# Patient Record
Sex: FEMALE | Race: WHITE | URBAN - METROPOLITAN AREA
[De-identification: names, ages, dates, MRNs, and addresses within clinical notes are randomized per-mention and may not be internally consistent; named-entity substitution may affect disease eponyms.]

---

## 2024-05-15 ENCOUNTER — TELEPHONE (OUTPATIENT)
Dept: OTHER | Facility: OTHER | Age: 34
End: 2024-05-15

## 2024-05-16 NOTE — TELEPHONE ENCOUNTER
Patient is calling regarding cancelling an appointment.    Date/Time: 05/16 @8am     Patient was rescheduled: YES [] NO [x]    Patient requesting call back to reschedule: YES [x] NO []

## 2024-06-14 ENCOUNTER — OFFICE VISIT (OUTPATIENT)
Dept: OBGYN CLINIC | Facility: CLINIC | Age: 34
End: 2024-06-14

## 2024-06-14 VITALS
DIASTOLIC BLOOD PRESSURE: 82 MMHG | BODY MASS INDEX: 28.95 KG/M2 | WEIGHT: 169.6 LBS | HEART RATE: 71 BPM | SYSTOLIC BLOOD PRESSURE: 120 MMHG | HEIGHT: 64 IN

## 2024-06-14 DIAGNOSIS — B37.9 YEAST INFECTION: ICD-10-CM

## 2024-06-14 DIAGNOSIS — Z01.419 ENCOUNTER FOR ANNUAL ROUTINE GYNECOLOGICAL EXAMINATION: Primary | ICD-10-CM

## 2024-06-14 LAB
BV WHIFF TEST VAG QL: NEGATIVE
CLUE CELLS SPEC QL WET PREP: NEGATIVE
PH SMN: 4.5 [PH]
T VAGINALIS VAG QL WET PREP: NEGATIVE
YEAST VAG QL WET PREP: POSITIVE

## 2024-06-14 PROCEDURE — G0476 HPV COMBO ASSAY CA SCREEN: HCPCS | Performed by: NURSE PRACTITIONER

## 2024-06-14 PROCEDURE — G0145 SCR C/V CYTO,THINLAYER,RESCR: HCPCS | Performed by: NURSE PRACTITIONER

## 2024-06-14 PROCEDURE — 99385 PREV VISIT NEW AGE 18-39: CPT | Performed by: NURSE PRACTITIONER

## 2024-06-14 PROCEDURE — 87210 SMEAR WET MOUNT SALINE/INK: CPT | Performed by: NURSE PRACTITIONER

## 2024-06-14 RX ORDER — FLUCONAZOLE 150 MG/1
150 TABLET ORAL ONCE
Qty: 1 TABLET | Refills: 1 | Status: SHIPPED | OUTPATIENT
Start: 2024-06-14 | End: 2024-06-14

## 2024-06-14 NOTE — PATIENT INSTRUCTIONS
Take Fluconazole as directed  Wear loose clothes and cotton underwear  PAP results can take up to 2 weeks  Call with needs or concerns  Return in 1 year

## 2024-06-14 NOTE — PROGRESS NOTES
Annual Exam    Assessment   1. Encounter for annual routine gynecological examination  Liquid-based pap, screening      2. Yeast infection  POCT wet mount    fluconazole (DIFLUCAN) 150 mg tablet        well woman       Plan       All questions answered.  Breast self exam technique reviewed and patient encouraged to perform self-exam monthly.  Contraception: tubal ligation.  Discussed healthy lifestyle modifications.  Follow up in 1 year.  Thin prep Pap smear.     Patient Instructions   Take Fluconazole as directed  Wear loose clothes and cotton underwear  PAP results can take up to 2 weeks  Call with needs or concerns  Return in 1 year  Pt verbalized understanding of all discussed.      Subjective      Kaila Purdy is a 33 y.o.  female who presents for annual well woman exam. Periods are regular every 28-30 days, lasting 4 days. No intermenstrual bleeding, spotting, or discharge.  Last GYN care was > 12 year ago in [place]  1 partner x 2 months, denies domestic violence, reinforce safe sex and condom use, provided [facility] information for STD esting    Explained thick white discharge was noted on exam, pt states she has noted the discharge and has had itching and burning.    Explained wet mount was positive for yeast, safe and effective use of Fluconazole was provided, discussed comfort measures      Depression Screening Follow-up Plan: Patient's depression screening was negative with a PHQ-2 score of 1. Their PHQ-9 score was 5. Clinically patient does not have depression. No treatment is required.      Current contraception: tubal ligation  History of abnormal Pap smear: no  Family history of uterine or ovarian cancer: no  Regular self breast exam: yes  History of abnormal mammogram: no  Family history of breast cancer: no  History of abnormal lipids: unknown  Menstrual History:  OB History          3    Para   3    Term   3            AB        Living   3          "SAB        IAB        Ectopic        Multiple        Live Births   3                Menarche age: 12  Patient's last menstrual period was 05/28/2024 (exact date).5/28/2024       The following portions of the patient's history were reviewed and updated as appropriate: allergies, current medications, past family history, past medical history, past social history, past surgical history and problem list.    Review of Systems  Pertinent items are noted in HPI.      Objective      /82 (BP Location: Right arm, Patient Position: Sitting, Cuff Size: Standard)   Pulse 71   Ht 5' 4\" (1.626 m)   Wt 76.9 kg (169 lb 9.6 oz)   LMP 05/28/2024 (Exact Date)   BMI 29.11 kg/m²     General: alert and oriented, in no acute distress, alert, appears stated age, and cooperative   Heart: NSR   Lungs: clear to auscultation bilaterally, WNL respiratory effort, negative cough or SOB   Thyroid: Negative masses palpable   Abdomen: soft, non-tender, without masses or organomegaly palpable   Vulva: normal   Vagina: Positive erythema, thick white discharge, ph 4.5, wet mount positive for yeast   Cervix: no bleeding following Pap, no cervical motion tenderness, no lesions, and Thick white discharge   Uterus: normal size, non-tender, normal shape and consistency   Adnexa: normal adnexa   Urethra: normal   Breasts: NT,negative masses palpable, negative discharge, or dimpling             "

## 2024-06-14 NOTE — LETTER
2024    To Kaila Jie Purdy  : 1990      Esta carta es para informarle que anyi resultados recientes de la prueba de Papanicolaou fueron revisados por mí y son NORMALES.  Nos vemos en 1 año para barros examen anual.    MICHELLE Nix

## 2024-06-17 LAB
HPV HR 12 DNA CVX QL NAA+PROBE: NEGATIVE
HPV16 DNA CVX QL NAA+PROBE: NEGATIVE
HPV18 DNA CVX QL NAA+PROBE: NEGATIVE

## 2024-06-19 LAB
LAB AP GYN PRIMARY INTERPRETATION: NORMAL
Lab: NORMAL
PATH INTERP SPEC-IMP: NORMAL

## 2024-12-05 ENCOUNTER — OFFICE VISIT (OUTPATIENT)
Dept: OBGYN CLINIC | Facility: CLINIC | Age: 34
End: 2024-12-05

## 2024-12-05 VITALS
WEIGHT: 175 LBS | DIASTOLIC BLOOD PRESSURE: 81 MMHG | SYSTOLIC BLOOD PRESSURE: 117 MMHG | HEART RATE: 73 BPM | BODY MASS INDEX: 30.04 KG/M2

## 2024-12-05 DIAGNOSIS — B37.31 VAGINAL CANDIDIASIS: ICD-10-CM

## 2024-12-05 DIAGNOSIS — N89.8 VAGINAL DISCHARGE: Primary | ICD-10-CM

## 2024-12-05 DIAGNOSIS — N89.8 VAGINA ITCHING: ICD-10-CM

## 2024-12-05 LAB
BV WHIFF TEST VAG QL: ABNORMAL
CLUE CELLS SPEC QL WET PREP: ABNORMAL
PH SMN: 4.5 [PH]
SL AMB POCT WET MOUNT: ABNORMAL
T VAGINALIS VAG QL WET PREP: ABNORMAL
YEAST VAG QL WET PREP: ABNORMAL

## 2024-12-05 PROCEDURE — 99213 OFFICE O/P EST LOW 20 MIN: CPT | Performed by: NURSE PRACTITIONER

## 2024-12-05 PROCEDURE — 87491 CHLMYD TRACH DNA AMP PROBE: CPT | Performed by: NURSE PRACTITIONER

## 2024-12-05 PROCEDURE — 87591 N.GONORRHOEAE DNA AMP PROB: CPT | Performed by: NURSE PRACTITIONER

## 2024-12-05 PROCEDURE — 87210 SMEAR WET MOUNT SALINE/INK: CPT | Performed by: NURSE PRACTITIONER

## 2024-12-05 RX ORDER — FLUCONAZOLE 150 MG/1
150 TABLET ORAL ONCE
Qty: 1 TABLET | Refills: 0 | Status: SHIPPED | OUTPATIENT
Start: 2024-12-05 | End: 2024-12-05

## 2024-12-05 NOTE — LETTER
2024    To Kaila Ceron Sandioh  : 1990      Esta carta es para informarle que anyi CULTURAS recientes para la gonorrea y la clamidia fueron revisadas por mí y son NORMALES.  Comuníquese con la oficina para amie federico si tiene alguna inquietud adicional.    MICHELLE Nix

## 2024-12-05 NOTE — PATIENT INSTRUCTIONS
Jorge Luis por barros confianza en nuestro equipo.   Le agradecemos y agradecemos anyi comentarios.   Si recibe amie encuesta nuestra, tómese unos momentos para informarnos cómo estamos.   Sinceramente,  MICHELLE Nix

## 2024-12-05 NOTE — PROGRESS NOTES
PROBLEM GYNECOLOGICAL VISIT    Kaila Purdy is a 34 y.o. female who presents today with complaint of vaginal itching.  Her general medical history has been reviewed and she reports it as follows:    Past Medical History:   Diagnosis Date    No known health problems      Past Surgical History:   Procedure Laterality Date     SECTION      , , 2018    OVARIAN CYST REMOVAL Bilateral     w/BTL    TUBAL LIGATION       OB History          3    Para   3    Term   3       0    AB   0    Living   3         SAB   0    IAB   0    Ectopic   0    Multiple   0    Live Births   3               Social History     Tobacco Use    Smoking status: Never    Smokeless tobacco: Never   Vaping Use    Vaping status: Never Used   Substance Use Topics    Alcohol use: Yes     Comment: couple times/month    Drug use: Never     Social History     Substance and Sexual Activity   Sexual Activity Yes    Partners: Male    Birth control/protection: Female Sterilization       No current outpatient medications    History of Present Illness:   Reports vaginal itching and discharge for the past month off/on.  Denies vaginal odor.  Denies pelvic pain.  Reports menses regular.    Review of Systems:  Review of Systems   Constitutional: Negative.    Gastrointestinal: Negative.    Genitourinary:  Positive for vaginal discharge and vaginal pain (itching). Negative for pelvic pain.       Physical Exam:  /81 (BP Location: Left arm, Patient Position: Sitting, Cuff Size: Standard)   Pulse 73   Wt 79.4 kg (175 lb)   LMP 2024 (Exact Date)   BMI 30.04 kg/m²   Physical Exam  Constitutional:       General: She is not in acute distress.  Genitourinary:      Vulva exam comments: Normal.      Vaginal discharge and erythema present.   Neurological:      Mental Status: She is alert.   Skin:     General: Skin is warm and dry.   Vitals reviewed.       Point of Care Testing:   -Wet mount: no clue cells, no  trichomonads, few WBC's, pH=4.5   -KOH mount: + hyphae   -Whiff: negative    Assessment:   1. Vaginal Candidiasis.    Plan:   1. Cultures ordered: GC/CT.   2. Given Rx Diflucan.   3. Return to office in 7 months for annual GYN exam.

## 2024-12-06 ENCOUNTER — TELEPHONE (OUTPATIENT)
Dept: OBGYN CLINIC | Facility: CLINIC | Age: 34
End: 2024-12-06

## 2024-12-06 LAB
C TRACH DNA SPEC QL NAA+PROBE: NEGATIVE
N GONORRHOEA DNA SPEC QL NAA+PROBE: NEGATIVE

## 2024-12-06 NOTE — TELEPHONE ENCOUNTER
Patient was at appointment yesterday for yeast infection and was instructed to come back next Thursday (12/12/24) to see Cherie Jordan. Provider schedule is open for 1:00pm and until 6:45pm. Patient cannot come in this day in the PM due to patient having to  family member at the airport in [place]. Please advise if patient can come in on another date or can be seen by another provider.

## 2024-12-12 ENCOUNTER — OFFICE VISIT (OUTPATIENT)
Dept: OBGYN CLINIC | Facility: CLINIC | Age: 34
End: 2024-12-12

## 2024-12-12 VITALS
DIASTOLIC BLOOD PRESSURE: 80 MMHG | WEIGHT: 175 LBS | BODY MASS INDEX: 30.04 KG/M2 | SYSTOLIC BLOOD PRESSURE: 121 MMHG | HEART RATE: 72 BPM

## 2024-12-12 DIAGNOSIS — N90.89 LABIAL SKIN TAG: Primary | ICD-10-CM

## 2024-12-12 PROCEDURE — 56605 BIOPSY OF VULVA/PERINEUM: CPT | Performed by: OBSTETRICS & GYNECOLOGY

## 2024-12-12 PROCEDURE — 88305 TISSUE EXAM BY PATHOLOGIST: CPT | Performed by: PATHOLOGY

## 2024-12-12 RX ORDER — LIDOCAINE HYDROCHLORIDE 20 MG/ML
2 INJECTION, SOLUTION EPIDURAL; INFILTRATION; INTRACAUDAL; PERINEURAL ONCE
Status: COMPLETED | OUTPATIENT
Start: 2024-12-12 | End: 2024-12-12

## 2024-12-12 RX ADMIN — LIDOCAINE HYDROCHLORIDE 2 ML: 20 INJECTION, SOLUTION EPIDURAL; INFILTRATION; INTRACAUDAL; PERINEURAL at 14:36

## 2024-12-12 NOTE — PROGRESS NOTES
Biopsy    Date/Time: 12/12/2024 1:15 PM    Performed by: Lashonda Sams DO  Authorized by: Lashonda Sams DO  Universal Protocol:  Procedure performed by: (Dr davis)  Risks and benefits: risks, benefits and alternatives were discussed  Consent given by: patient  Patient understanding: patient states understanding of the procedure being performed  Patient consent: the patient's understanding of the procedure matches consent given  Procedure consent: procedure consent matches procedure scheduled  Required items: required blood products, implants, devices, and special equipment available  Patient identity confirmed: verbally with patient    Procedure Details - Lesion Biopsy:     Body area:  Anogenital    Anogenital location:  Vulva    Biopsy method: shave biopsy      Biopsy tissue type: skin    Initial size (mm):  2     Patient with 2 week of skin tag, hurts with touch. No bleeding. Appears as condyloma      Lashonda Sams DO   Obstetrics & Gynecology PGY-II  12/12/24  2:34 PM

## 2024-12-16 PROCEDURE — 88305 TISSUE EXAM BY PATHOLOGIST: CPT | Performed by: PATHOLOGY

## 2024-12-20 ENCOUNTER — TELEPHONE (OUTPATIENT)
Dept: LABOR AND DELIVERY | Facility: HOSPITAL | Age: 34
End: 2024-12-20

## 2024-12-20 ENCOUNTER — TELEPHONE (OUTPATIENT)
Age: 34
End: 2024-12-20

## 2024-12-20 DIAGNOSIS — L98.0 PYOGENIC GRANULOMA: Primary | ICD-10-CM

## 2024-12-20 NOTE — TELEPHONE ENCOUNTER
Called patient to discuss results of biopsy let her know that the biopsy was consistent with pyogenic granuloma.  Placed referral to dermatology.  All patient questions answered    Lashonda Sams, DO   Obstetrics & Gynecology PGY-II  12/20/24  2:18 PM

## 2024-12-24 ENCOUNTER — TELEPHONE (OUTPATIENT)
Age: 34
End: 2024-12-24

## 2024-12-24 NOTE — TELEPHONE ENCOUNTER
LVM for pt regarding wanting estimate for appt in September. Informed pt that for self pay there will be a $30 copay but the other estimates will go through billing, provided phone number [phone]. Advised pt to callback if have any other questions we can help with.

## 2025-01-11 NOTE — PROGRESS NOTES
"OB/GYN VISIT  Kaila Purdy  2025  9:51 AM    Subjective:     Kaila Purdy is a 34 y.o.  female who presents for follow up of vulvar skin lesion. Patient underwent biopsy on 24 which came back as pyogenic granuloma. Patient notes that the biopsy healed well but the lesion itself is still bothering her as it is rubbing against her clothes.      Past Medical History:   Diagnosis Date    No known health problems      Past Surgical History:   Procedure Laterality Date     SECTION      , , 2018    OVARIAN CYST REMOVAL Bilateral     w/BTL    TUBAL LIGATION         Objective:    Vitals: Blood pressure 120/81, pulse 60, height 5' 4\" (1.626 m), weight 79.8 kg (176 lb), last menstrual period 2024.Body mass index is 30.21 kg/m².    Physical Exam  Constitutional:       General: She is not in acute distress.  HENT:      Head: Normocephalic and atraumatic.   Eyes:      Extraocular Movements: Extraocular movements intact.   Cardiovascular:      Rate and Rhythm: Normal rate.   Pulmonary:      Effort: Pulmonary effort is normal. No respiratory distress.   Abdominal:      General: Abdomen is flat.   Genitourinary:     Comments: Approximately 5mm lobular lesion noted at superior aspect of midline labia majora  Musculoskeletal:         General: No swelling.   Skin:     General: Skin is warm and dry.   Neurological:      General: No focal deficit present.      Mental Status: She is alert.   Psychiatric:         Mood and Affect: Mood normal.       Biopsy    Date/Time: 2025 9:00 AM    Performed by: Laverne Padilla MD  Authorized by: Laverne Padilla MD  Universal Protocol:  Procedure performed by: (Ismael)  Consent: Verbal consent obtained.  Consent given by: patient  Patient understanding: patient states understanding of the procedure being performed    Procedure Details - Lesion Biopsy:     Body area:  Anogenital    Anogenital location:  Vulva    Biopsy " tissue type: skin    Malignancy: benign lesion       3mL of lidocaine with epinephrine injected at base of lesion. Surgical scissors used to remove pyogenic granuloma. Base then cauterized with silver nitrate. Adequate hemostasis obtained.      Assessment/Plan:  1. Pyogenic granuloma  -     lidocaine-epinephrine (XYLOCAINE/EPINEPHRINE) 2 %-1:100,000 injection 1 mL  -     Tissue Exam  - Counseled patient on biopsy results   - Lesion removed in office today  - Patient tolerated procedure well  - Counseled patient to avoid shaving until lesion has healed      Laverne Padilla MD  1/14/2025  9:51 AM

## 2025-01-14 ENCOUNTER — OFFICE VISIT (OUTPATIENT)
Dept: OBGYN CLINIC | Facility: CLINIC | Age: 35
End: 2025-01-14

## 2025-01-14 VITALS
DIASTOLIC BLOOD PRESSURE: 81 MMHG | SYSTOLIC BLOOD PRESSURE: 120 MMHG | WEIGHT: 176 LBS | HEIGHT: 64 IN | BODY MASS INDEX: 30.05 KG/M2 | HEART RATE: 60 BPM

## 2025-01-14 DIAGNOSIS — L98.0 PYOGENIC GRANULOMA: Primary | ICD-10-CM

## 2025-01-14 PROCEDURE — 88305 TISSUE EXAM BY PATHOLOGIST: CPT | Performed by: PATHOLOGY

## 2025-01-14 PROCEDURE — 56605 BIOPSY OF VULVA/PERINEUM: CPT | Performed by: OBSTETRICS & GYNECOLOGY

## 2025-01-14 RX ORDER — LIDOCAINE HYDROCHLORIDE AND EPINEPHRINE BITARTRATE 20; .01 MG/ML; MG/ML
1 INJECTION, SOLUTION SUBCUTANEOUS ONCE
Status: COMPLETED | OUTPATIENT
Start: 2025-01-14 | End: 2025-01-14

## 2025-01-14 RX ADMIN — LIDOCAINE HYDROCHLORIDE AND EPINEPHRINE BITARTRATE 1 ML: 20; .01 INJECTION, SOLUTION SUBCUTANEOUS at 09:46

## 2025-01-16 PROCEDURE — 88305 TISSUE EXAM BY PATHOLOGIST: CPT | Performed by: PATHOLOGY

## 2025-01-28 ENCOUNTER — TELEPHONE (OUTPATIENT)
Age: 35
End: 2025-01-28

## 2025-01-28 NOTE — TELEPHONE ENCOUNTER
LVM for pt regarding appt on 09/12 with Dr Delatorre in [place]. Informed pt that Dr BARFIELD is going through a schedule change for his Friday afternoon appointment slots and that his Tuesday afternoon appointment slots are opening for patients. Due to this schedule change, I informed pt that their new appt will be 07/29 at 2 pm with Dr BARFIELD in [place]. Advised pt to callback to r/s or cancel this appt if needed.

## 2025-04-16 PROBLEM — N76.0 VAGINITIS: Status: ACTIVE | Noted: 2025-04-16

## 2025-04-16 NOTE — PROGRESS NOTES
"Name: Kaila Purdy      : 1990      MRN: [id number]  Encounter Provider: Jennie Heaton MD  Encounter Date: 2025   Encounter department: [facility]    Visit conducted with [facility] [place] [employer]  [id number]  Assessment & Plan  Acute vaginitis  Exam and POCT wet mount notable for bacterial vaginosis  Prescription for metronidazole sent    BV (bacterial vaginosis)    Orders:    metroNIDAZOLE (FLAGYL) 500 mg tablet; Take 1 tablet (500 mg total) by mouth every 12 (twelve) hours for 7 days    Vaginal lesion  HSV swab collected in clinic for vulvar lesion  Orders:    HSV TYPE 1,2 DNA PCR [facility] SWAB ONLY    High risk heterosexual behavior  STI testing ordered   Orders:    HIV 1/2 AG/AB w Reflex [facility] for 2 yr old and above; Future    Hepatitis C antibody; Future    Hepatitis B surface antigen; Future    RPR-Syphilis Screening (Total Syphilis IGG/IGM); Future    Chlamydia/GC amplified DNA by PCR; Future        History of Present Illness   HPI  Kaila Purdy is a 34 y.o. female who presents for five days of vaginal itching and foul smelling thin white discharge. She additionally reports bumps that appear on her vulva that are like \"small bumps filled with water\" and appear similar to oral lesions she has had on her lips. She is concerned this is herpes but has never been diagnosed/treated for HSV before. She reports they are moderately painful. She denies other symptoms and requests STI testing today.    ROS negative except per HPI    Medical History Reviewed by provider this encounter:     .     Objective   /74 (BP Location: Left arm, Patient Position: Sitting, Cuff Size: Standard)   Wt 78.3 kg (172 lb 9.6 oz)   LMP 2025 (Approximate)   BMI 29.63 kg/m²      Physical Exam  Vitals and nursing note reviewed.   Constitutional:       General: She is not in acute distress.     Appearance: She is well-developed.   Eyes:      " Conjunctiva/sclera: Conjunctivae normal.   Cardiovascular:      Rate and Rhythm: Normal rate.   Pulmonary:      Effort: Pulmonary effort is normal. No respiratory distress.   Abdominal:      Palpations: Abdomen is soft.      Tenderness: There is no abdominal tenderness.   Genitourinary:     Comments: Vulva with small fleshy papule on left labia majora, does not appear clear or fluid filled, no erythema, moderate tenderness  On speculum exam thin white discharge present with fishy odor. No vaginal or cervical lesions  Musculoskeletal:         General: No swelling.   Skin:     General: Skin is warm and dry.      Capillary Refill: Capillary refill takes less than 2 seconds.   Neurological:      Mental Status: She is alert and oriented to person, place, and time. Mental status is at baseline.   Psychiatric:         Mood and Affect: Mood normal.         Jennie Heaton MD  PGY 2, Obstetrics and Gynecology  4/17/2025  2:09 PM

## 2025-04-17 ENCOUNTER — OFFICE VISIT (OUTPATIENT)
Dept: OBGYN CLINIC | Facility: CLINIC | Age: 35
End: 2025-04-17

## 2025-04-17 VITALS — WEIGHT: 172.6 LBS | DIASTOLIC BLOOD PRESSURE: 74 MMHG | SYSTOLIC BLOOD PRESSURE: 102 MMHG | BODY MASS INDEX: 29.63 KG/M2

## 2025-04-17 DIAGNOSIS — N76.0 ACUTE VAGINITIS: ICD-10-CM

## 2025-04-17 DIAGNOSIS — N76.0 BV (BACTERIAL VAGINOSIS): Primary | ICD-10-CM

## 2025-04-17 DIAGNOSIS — Z72.51 HIGH RISK HETEROSEXUAL BEHAVIOR: ICD-10-CM

## 2025-04-17 DIAGNOSIS — N89.8 VAGINAL LESION: ICD-10-CM

## 2025-04-17 DIAGNOSIS — B96.89 BV (BACTERIAL VAGINOSIS): Primary | ICD-10-CM

## 2025-04-17 LAB
BV WHIFF TEST VAG QL: POSITIVE
CLUE CELLS SPEC QL WET PREP: PRESENT
PH SMN: 5 [PH]
SL AMB POCT WET MOUNT: ABNORMAL
T VAGINALIS VAG QL WET PREP: ABNORMAL
YEAST VAG QL WET PREP: ABNORMAL

## 2025-04-17 PROCEDURE — 87529 HSV DNA AMP PROBE: CPT

## 2025-04-17 PROCEDURE — 99213 OFFICE O/P EST LOW 20 MIN: CPT | Performed by: OBSTETRICS & GYNECOLOGY

## 2025-04-17 PROCEDURE — 87210 SMEAR WET MOUNT SALINE/INK: CPT | Performed by: OBSTETRICS & GYNECOLOGY

## 2025-04-17 RX ORDER — METRONIDAZOLE 500 MG/1
500 TABLET ORAL EVERY 12 HOURS SCHEDULED
Qty: 14 TABLET | Refills: 0 | Status: SHIPPED | OUTPATIENT
Start: 2025-04-17 | End: 2025-04-24

## 2025-04-18 ENCOUNTER — RESULTS FOLLOW-UP (OUTPATIENT)
Dept: OBGYN CLINIC | Facility: CLINIC | Age: 35
End: 2025-04-18

## 2025-04-18 DIAGNOSIS — B00.9 HERPES: Primary | ICD-10-CM

## 2025-04-18 LAB
HSV1 DNA SPEC QL NAA+PROBE: DETECTED
HSV1 DNA SPEC QL NAA+PROBE: NOT DETECTED

## 2025-04-18 RX ORDER — VALACYCLOVIR HYDROCHLORIDE 1 G/1
1000 TABLET, FILM COATED ORAL 2 TIMES DAILY
Qty: 20 TABLET | Refills: 0 | Status: SHIPPED | OUTPATIENT
Start: 2025-04-18 | End: 2025-04-28

## 2025-04-18 NOTE — TELEPHONE ENCOUNTER
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 We discussed that this is consistent with herpes as she expected. Kaila has not been treated for herpes before so will start treatment with Valacyclovir 1000 mg BID for 10 days. She verbalized understanding and will pick the medication up from her pharmacy.    Jennie Heaton MD  PGY 2, Obstetrics and Gynecology  4/18/2025  3:51 PM

## 2025-05-22 ENCOUNTER — OFFICE VISIT (OUTPATIENT)
Dept: FAMILY MEDICINE CLINIC | Facility: CLINIC | Age: 35
End: 2025-05-22

## 2025-05-22 VITALS
DIASTOLIC BLOOD PRESSURE: 80 MMHG | WEIGHT: 172 LBS | RESPIRATION RATE: 18 BRPM | TEMPERATURE: 98 F | OXYGEN SATURATION: 98 % | HEART RATE: 70 BPM | SYSTOLIC BLOOD PRESSURE: 116 MMHG | HEIGHT: 64 IN | BODY MASS INDEX: 29.37 KG/M2

## 2025-05-22 DIAGNOSIS — R35.0 URINARY FREQUENCY: ICD-10-CM

## 2025-05-22 DIAGNOSIS — R42 POSITIONAL LIGHTHEADEDNESS: ICD-10-CM

## 2025-05-22 DIAGNOSIS — R63.5 UNEXPLAINED WEIGHT GAIN: ICD-10-CM

## 2025-05-22 DIAGNOSIS — Z00.00 ANNUAL PHYSICAL EXAM: Primary | ICD-10-CM

## 2025-05-22 LAB
SL AMB  POCT GLUCOSE, UA: ABNORMAL
SL AMB LEUKOCYTE ESTERASE,UA: ABNORMAL
SL AMB POCT BILIRUBIN,UA: ABNORMAL
SL AMB POCT BLOOD,UA: ABNORMAL
SL AMB POCT CLARITY,UA: CLEAR
SL AMB POCT COLOR,UA: YELLOW
SL AMB POCT HEMOGLOBIN AIC: 5.2 (ref ?–6.5)
SL AMB POCT KETONES,UA: ABNORMAL
SL AMB POCT NITRITE,UA: ABNORMAL
SL AMB POCT PH,UA: 6
SL AMB POCT SPECIFIC GRAVITY,UA: 1.02
SL AMB POCT URINE PROTEIN: ABNORMAL
SL AMB POCT UROBILINOGEN: ABNORMAL

## 2025-05-22 NOTE — PROGRESS NOTES
Adult Annual Physical  Name: Kaila Purdy      : 1990      MRN: [id number]  Encounter Provider: MICHELLE Bills  Encounter Date: 2025   Encounter department: [facility]    :  Assessment & Plan  Annual physical exam  Immunizations and preventive care screenings were discussed with patient today. Appropriate education was printed on patient's after visit summary.        Urinary frequency  UA bland and A1C within normal range.   Encouraged patient to limit beverages before bed.     Orders:    POCT hemoglobin A1c    POCT urine dip auto non-scope    Unexplained weight gain  Denies changes to diet or activity. Will order thyroid panel to rule out hypothyroidism.   Encouraged healthy diet choices and increasing exercise.     Orders:    TSH, 3rd generation with Free T4 reflex; Future    Positional lightheadedness  Exam benign.   Patient endorses heavy menses. Will rule out LAILA due to chronic blood loss as well as metabolic derangements.   Counseled on adequate fluid intake and nutrition, change positions slowly.     Orders:    CBC and differential; Future    Comprehensive metabolic panel; Future    Iron Panel (Includes Ferritin, Iron Sat%, Iron, and TIBC); Future        Preventive Screenings:  - Diabetes Screening: orders placed  - Cholesterol Screening: patient declines   - Hepatitis C screening: patient declines   - HIV screening: patient declines   - Cervical cancer screening: screening up-to-date   - Colon cancer screening: screening not indicated   - Lung cancer screening: screening not indicated     Immunizations:  - Immunizations due: Tdap  - Risks/benefits immunizations discussed    - The patient declines recommended vaccines currently despite my recommendations      Counseling/Anticipatory Guidance:    - Dental health: discussed importance of regular tooth brushing, flossing, and dental visits.   - Sexual health: discussed sexually transmitted  "diseases, partner selection, use of condoms, avoidance of unintended pregnancy, and contraceptive alternatives.   - Diet: discussed recommendations for a healthy/well-balanced diet.   - Exercise: the importance of regular exercise/physical activity was discussed. Recommend exercise 3-5 times per week for at least 30 minutes.   - Injury prevention: discussed safety/seat belts, safety helmets, smoke detectors, carbon monoxide detectors, and smoking near bedding or upholstery.     BMI Counseling: Body mass index is 29.52 kg/m². The BMI is above normal. Nutrition recommendations include decreasing portion sizes, encouraging healthy choices of fruits and vegetables, decreasing fast food intake, consuming healthier snacks, limiting drinks that contain sugar, moderation in carbohydrate intake, increasing intake of lean protein, reducing intake of saturated and trans fat and reducing intake of cholesterol. Exercise recommendations include moderate physical activity 150 minutes/week, exercising 3-5 times per week and strength training exercises. Rationale for BMI follow-up plan is due to patient being overweight or obese.         History of Present Illness       Adult Annual Physical:  Patient presents for annual physical.       Kaila Pinzon is a 34 year old female who presents to the office today for a routine physical exam and to establish care.     At today's visit, she reports she has been feeling dizzy, tired. She also feels like everything she eats tastes very sweet and she has occasional parasthesia of her hands.  She is also complaining of rapid weight gain without change in diet or activity. She endorses nocturia at least 2-3 times per night and endorses a positive family history of diabetes. She denies fever, body aches, chills, malaise, sore throat, diarrhea, rash. Symptoms have been present \"for a long time\". Patient is unable to give an exact time frame. .     Diet and Physical Activity:  - " "Diet/Nutrition: no special diet.  - Exercise: no formal exercise.    General Health:  - Sleep: sleeps well and 4-6 hours of sleep on average.  - Hearing: normal hearing bilateral ears.  - Vision: no vision problems.  - Dental: no dental visits for > 1 year, brushes teeth twice daily and floss regularly.    /GYN Health:  - Follows with GYN: yes.   - Last menstrual cycle: 5/17/2025.   - History of STDs: no  - Contraception: barrier methods.      Advanced Care Planning:  - Has an advanced directive?: no    - Has a durable medical POA?: no    - ACP document given to patient?: no      Review of Systems   Constitutional:  Positive for fatigue and unexpected weight change. Negative for appetite change, chills and fever.   HENT:  Negative for ear pain and sore throat.    Eyes:  Negative for pain and visual disturbance.   Respiratory:  Negative for cough and shortness of breath.    Cardiovascular:  Negative for chest pain and palpitations.   Gastrointestinal:  Negative for abdominal pain and vomiting.   Genitourinary:  Negative for dysuria and hematuria.        Nocturia 2-3 times per night   Musculoskeletal:  Negative for arthralgias, back pain, joint swelling and myalgias.   Skin:  Negative for color change and rash.   Neurological:  Positive for light-headedness. Negative for tremors, seizures, syncope, facial asymmetry, weakness, numbness and headaches.   All other systems reviewed and are negative.    Pertinent Medical History   Patient Active Problem List   Diagnosis    Vaginitis     Past Medical History:   Diagnosis Date    No known health problems               Medical History Reviewed by provider this encounter:     .  Medications Ordered Prior to Encounter[1]   Social History[2]    Objective   /80 (BP Location: Right arm, Patient Position: Sitting, Cuff Size: Standard)   Pulse 70   Temp 98 °F (36.7 °C) (Temporal)   Resp 18   Ht 5' 4\" (1.626 m)   Wt 78 kg (172 lb)   LMP 05/17/2025 (Approximate)   SpO2 " 98%   Breastfeeding No   BMI 29.52 kg/m²     Physical Exam  Vitals and nursing note reviewed.   Constitutional:       General: She is not in acute distress.     Appearance: She is well-developed.   HENT:      Head: Normocephalic and atraumatic.      Right Ear: Tympanic membrane, ear canal and external ear normal.      Left Ear: Tympanic membrane, ear canal and external ear normal.      Nose: Nose normal. No congestion or rhinorrhea.      Mouth/Throat:      Mouth: Mucous membranes are moist.      Pharynx: Oropharynx is clear. No oropharyngeal exudate or posterior oropharyngeal erythema.     Eyes:      Extraocular Movements: Extraocular movements intact.      Conjunctiva/sclera: Conjunctivae normal.      Pupils: Pupils are equal, round, and reactive to light.       Cardiovascular:      Rate and Rhythm: Normal rate and regular rhythm.      Pulses: Normal pulses.      Heart sounds: Normal heart sounds. No murmur heard.  Pulmonary:      Effort: Pulmonary effort is normal. No respiratory distress.      Breath sounds: Normal breath sounds. No wheezing.   Abdominal:      General: Bowel sounds are normal.      Palpations: Abdomen is soft.      Tenderness: There is no abdominal tenderness.     Musculoskeletal:         General: No swelling.      Cervical back: Neck supple.   Lymphadenopathy:      Cervical: No cervical adenopathy.     Skin:     General: Skin is warm and dry.      Capillary Refill: Capillary refill takes less than 2 seconds.      Findings: No lesion or rash.     Neurological:      General: No focal deficit present.      Mental Status: She is alert.      Cranial Nerves: No cranial nerve deficit.      Sensory: No sensory deficit.      Motor: No weakness.      Coordination: Coordination normal.      Gait: Gait normal.     Psychiatric:         Mood and Affect: Mood normal.         Behavior: Behavior normal.              [1]   Current Outpatient Medications on File Prior to Visit   Medication Sig Dispense Refill     valACYclovir (VALTREX) 1,000 mg tablet Take 1 tablet (1,000 mg total) by mouth 2 (two) times a day for 10 days 20 tablet 0     No current facility-administered medications on file prior to visit.   [2]   Social History  Tobacco Use    Smoking status: Never     Passive exposure: Never    Smokeless tobacco: Never   Vaping Use    Vaping status: Never Used   Substance and Sexual Activity    Alcohol use: Yes     Comment: couple times/month    Drug use: Never    Sexual activity: Yes     Partners: Male     Birth control/protection: Female Sterilization

## 2025-05-22 NOTE — PATIENT INSTRUCTIONS
Patient Education     Dieta saludable para el corazón   General   Con un plan de alimentación saludable para el corazón, aprenderá a seleccionar mejor los alimentos. Esta dieta puede disminuir los niveles de colesterol en la toña, controlar la presión arterial y disminuir el riesgo de problemas cardíacos. Las porciones más pequeñas también pueden ser útiles.  El sodio es un tipo de mineral que se encuentra en muchos alimentos. Ayuda a mantener el equilibrio de líquidos en barros cuerpo. Demasiado sodio puede aumentar barros presión arterial. También puede hacer que tome más agua. Earl se llama edema. Preste especial atención a la cantidad de sal o sodio en anyi alimentos. Es posible que deba evitar la sal o consumir alimentos con menos sodio.  El colesterol es amie sustancia cerosa similar a la grasa presente en la toña. Es normal tener un poco de colesterol en la toña, ya que el cuerpo lo produce. También obtiene colesterol adicional de todos los productos de origen animal. Estas alimentos incluyen las afshan, los huevos y los productos lácteos. El exceso de colesterol en la toña puede obstruir o dañar los vasos sanguíneos. Earl puede provocar un ataque cardíaco o un accidente cerebrovascular.  Las grasas en los alimentos contienen calorías que dan energía. No todas las grasas son malas. Algunas grasas son saludables, kemi las que se encuentran en el pescado, frutas secas (nueces, almendras, etc.) y aceite de falk. Se conocen kemi grasas insaturadas. Permiten controlar las funciones corporales y disminuir los niveles de colesterol. Conozca las mejores grasas para incluir en barros dieta y dónde utilizarlas. Loris demasiadas grasas puede aumentar la probabilidad de pesar más de lo saludable. Earl aumenta el riesgo de sufrir muchos problemas del corazón.  La fibra está presente en las plantas. La carne y los productos lácteos no contienen fibra. La fibra permite disminuir los niveles no saludables de colesterol. Es  posible que necesite beber más agua a medida que consuma más fibra para evitar el estreñimiento.  ¿Qué cambios en la forma de justin se necesitan?   Lleve amie dieta saludable y ejercítese con frecuencia. Intente quemar el mismo número de calorías que ingiere cada día.  ¿Qué cambios en la dieta son necesarios?   Coma pescados aceitosos al menos 2 veces a la semana. Estos pescados incluyen atún, salmón y caballa.  Limite el sodio a no más de 2.300 mg por día. [place] equivale a aproximadamente 1 cucharadita (5 gramos) de sal de plummer. Use poca sal o nada de sal a la hora de preparar la comida. Pruebe otras especias o condimentos en lugar de la sal.  Limite la ingesta de colesterol a menos de 300 mg por día. Puede hacer esto consumiendo afshan magras. También coma muchas frutas, verduras y productos lácteos sin grasa o con bajo contenido de grasa.  Limite el consumo de grasas trans. Las grasas trans se encuentran en muchos alimentos procesados, kemi la margarina en brian, manteca y algunos alimentos fritos. También disminuya la cantidad de grasas hidrogenadas que consume. Se utilizan para hacer productos de panadería, panecillos, galletas dulces y saladas, jayne fritas y muchos refrigerios.  Limite el consumo de cerveza, vino y bebidas mixtas (alcohol) a no más de 1 trago por día.  ¿Quién debe realizar esta dieta?   Un corazón saludable es jackson para todos.  ¿Qué alimentos pueden comerse?   Granos: Intente comer 6 a 8 porciones de granos enteros y alimentos con alto contenido de fibras todos los días. Estos son pan integral, cereales, arroz integral o pasta.  Frutas y verduras: Coma 4 a 5 porciones todos los días. Intente elegir muchos tipos y colores. Intente comer más frutas y verduras frescas o congeladas. Busque alimentos bajos en sodio o sin sal si elige alimentos enlatados. Enjuague los productos enlatados antes de cocinar o comer. Los guisantes secos, los frijoles y las lentejas también son buenas opciones.  Productos  lácteos: Elija leche con bajo contenido de grasa (1%) o descremada. Coma productos sin grasa o con bajo contenido de grasa.  Proteínas: Intente comer más afshan con poca grasa o magras kemi wen y pavo. Coma menos afshan reid y, en barros lugar, coma más pescado, huevo, claras de huevo y frijoles.  Grasas: Utilice grasas buenas que se encuentran en el pescado, las frutas secas (nueces, almendras, etc.) y los aguacates. Intente usar aceite de falk, aceite de canola, mayonesa y aderezos para ensalada bajos en sodio y bajos en grasa. Use aceites de maíz, cártamo, girasol y soja.  Condimentos: Use caldos, sopas, salsa de soja y condimentos de bajo contenido de sodio o sin sal. Los pimientos, las hierbas, las especias, el vinagre, el jugo de sharmin o de lima son excelentes opciones para condimentar. Se pueden consumir en pequeñas cantidades azúcar, cacao en polvo, miel, jarabe y mermeladas.  Golosinas: Galletas dulces, pasteles y tartas con bajo contenido de grasa y libres de grasas trans, galletas de harina de armida entero, galletas de animalitos, barras de higo de bajo contenido de grasa y galletas de jengibre.     ¿Qué alimentos deben comerse con moderación o deben evitarse?   Granos: Panes salados, bollos, galletas saladas, panes rápidos, harina con levadura, mezclas de panes, migas de pan normal, cereales calientes instantáneos, arroz preparado comercialmente, pasta, mezcla para rellenos  Frutas y verduras: Yoshi y mezclas de verduras preparadas comercialmente, verduras enlatadas regulares y jugos, verduras congeladas con salsa o verduras en vinagre, frutas procesadas con sal o azúcar agregadas  Productos lácteos: Leche entera, leche malteada, leche con chocolate, panfilo de mantequilla  Proteínas: Carne, pescado y aves de rush ahumados, curados, salados o enlatados, kemi tocino y salchichas  Grasas: Reduzca el consumo de grasas sólidas kemi la manteca, la mantequilla o la margarina.  Condimentos y refrigerios:  Guisantes, frijoles y aceitunas con alex y enlatados, refrigerios salados, alimentos fritos, refrescos, jugos u otras bebidas endulzadas, agua ablandada comercialmente. El miso, la salsa, el kétchup, la salsa barbacoa, la salsa [place], la salsa de soja y la salsa teriyaki también tienen un alto contenido de sal.  Dulces: Productos horneados con alto contenido de grasa, kemi molletes, donas, repostería, productos horneados comercialmente  Consejos útiles   Cuando vaya a amie tremayne de comestibles, lleve amie lista o un plan de comidas. No vaya de compras cuando tenga hambre para evitar antojos.  Debe saber cuál es el contenido de sodio y grasas de los alimentos que come. Jocelin atentamente las etiquetas de los alimentos. Estas le mostrarán cuánto de estos hay en amie porción. Esta cantidad se da kemi porcentaje de la cantidad total que necesita al día. Leer las etiquetas le ayudará a miky decisiones saludables.     Evite las comidas rápidas.  Controle las porciones cuando coma fuera. Divida un pedido o lleve la mitad a casa para comer después.     Hable con un nutricionista para obtener ayuda.  ¿Dónde puedo obtener más información?   American Heart Association  http://www.heart.org/HEARTORG/GettingHealthy/Diet-and-Lifestyle-Recommendations_UC_305855_Article.jsp#.SgfyqUd9ITD   [employer] [place]  http://www.dietitians.ca/Nutrition-Resources-A-Z/Fact-Sheet-Pages%28HTML%29/Heart-Health/Heart-Healthy-Eating--Cholesterol.aspx   Women's Health  http://www.hearthealthywomen.org/health-and-wellness/featured/heart-healthy-diet.html   Exención de responsabilidad y uso de la información del consumidor   Esta información general es un resumen limitado de la información sobre el diagnóstico, el tratamiento y/o la medicación. No pretende ser exhaustivo y debe utilizarse kemi amie herramienta para ayudar al usuario a comprender y/o evaluar las posibles opciones de diagnóstico y tratamiento. NO incluye toda la información sobre  "las enfermedades, los tratamientos, los medicamentos, los efectos secundarios o los riesgos que pueden aplicarse a un paciente específico. No tiene por objeto ser un consejo médico ni un sustituto del consejo médico. Tampoco pretende reemplazar al diagnóstico o el tratamiento proporcionados por un proveedor de atención médica con base en el examen y la evaluación por parte de tremayne proveedor de las circunstancias específicas y únicas de un paciente. Los pacientes deben hablar con un proveedor de atención médica para obtener información completa sobre barros andie, preguntas médicas y opciones de tratamiento, incluidos los riesgos o beneficios relacionados con el uso de medicamentos. Esta información no respalda ningún tratamiento o medicamento kemi seguro, eficaz o aprobado para tratar a un paciente específico. [employer] y anyi afiliados renuncian a cualquier garantía o responsabilidad relacionada con esta información o con el uso que se manoj de esta. El uso de esta información se rige por las Condiciones de uso, disponibles en https://www.wolSAMHI Hotelsuwer.com/en/know/clinical-effectiveness-terms   Copyright   Copyright © 2024 UpToDate[employer] Inc. y anyi licenciantes y/o afiliados. Todos los derechos reservados.  Patient Education     Routine physical for adults   The Basics   Written by the doctors and editors at [facility]   What is a physical? -- A physical is a routine visit, or \"check-up,\" with your doctor. You might also hear it called a \"wellness visit\" or \"preventive visit.\"  During each visit, the doctor will:   Ask about your physical and mental health   Ask about your habits, behaviors, and lifestyle   Do an exam   Give you vaccines if needed   Talk to you about any medicines you take   Give advice about your health   Answer your questions  Getting regular check-ups is an important part of taking care of your health. It can help your doctor find and treat any problems you have. But it's also important for preventing " "health problems.  A routine physical is different from a \"sick visit.\" A sick visit is when you see a doctor because of a health concern or problem. Since physicals are scheduled ahead of time, you can think about what you want to ask the doctor.  How often should I get a physical? -- It depends on your age and health. In general, for people age 21 years and older:   If you are younger than 50 years, you might be able to get a physical every 3 years.   If you are 50 years or older, your doctor might recommend a physical every year.  If you have an ongoing health condition, like diabetes or high blood pressure, your doctor will probably want to see you more often.  What happens during a physical? -- In general, each visit will include:   Physical exam - The doctor or nurse will check your height, weight, heart rate, and blood pressure. They will also look at your eyes and ears. They will ask about how you are feeling and whether you have any symptoms that bother you.   Medicines - It's a good idea to bring a list of all the medicines you take to each doctor visit. Your doctor will talk to you about your medicines and answer any questions. Tell them if you are having any side effects that bother you. You should also tell them if you are having trouble paying for any of your medicines.   Habits and behaviors - This includes:   Your diet   Your exercise habits   Whether you smoke, drink alcohol, or use drugs   Whether you are sexually active   Whether you feel safe at home  Your doctor will talk to you about things you can do to improve your health and lower your risk of health problems. They will also offer help and support. For example, if you want to quit smoking, they can give you advice and might prescribe medicines. If you want to improve your diet or get more physical activity, they can help you with this, too.   Lab tests, if needed - The tests you get will depend on your age and situation. For example, your " "doctor might want to check your:   Cholesterol   Blood sugar   Iron level   Vaccines - The recommended vaccines will depend on your age, health, and what vaccines you already had. Vaccines are very important because they can prevent certain serious or deadly infections.   Discussion of screening - \"Screening\" means checking for diseases or other health problems before they cause symptoms. Your doctor can recommend screening based on your age, risk, and preferences. This might include tests to check for:   Cancer, such as breast, prostate, cervical, ovarian, colorectal, prostate, lung, or skin cancer   Sexually transmitted infections, such as chlamydia and gonorrhea   Mental health conditions like depression and anxiety  Your doctor will talk to you about the different types of screening tests. They can help you decide which screenings to have. They can also explain what the results might mean.   Answering questions - The physical is a good time to ask the doctor or nurse questions about your health. If needed, they can refer you to other doctors or specialists, too.  Adults older than 65 years often need other care, too. As you get older, your doctor will talk to you about:   How to prevent falling at home   Hearing or vision tests   Memory testing   How to take your medicines safely   Making sure that you have the help and support you need at home  All topics are updated as new evidence becomes available and our peer review process is complete.  This topic retrieved from [employer] on: May 02, 2024.  Topic 403401 Version 1.0  Release: 32.4.3 - C32.122  © 2024 UpToDate, Inc. and/or its affiliates. All rights reserved.  Consumer Information Use and Disclaimer   Disclaimer: This generalized information is a limited summary of diagnosis, treatment, and/or medication information. It is not meant to be comprehensive and should be used as a tool to help the user understand and/or assess potential diagnostic and treatment " options. It does NOT include all information about conditions, treatments, medications, side effects, or risks that may apply to a specific patient. It is not intended to be medical advice or a substitute for the medical advice, diagnosis, or treatment of a health care provider based on the health care provider's examination and assessment of a patient's specific and unique circumstances. Patients must speak with a health care provider for complete information about their health, medical questions, and treatment options, including any risks or benefits regarding use of medications. This information does not endorse any treatments or medications as safe, effective, or approved for treating a specific patient. UpToDate, Inc. and its affiliates disclaim any warranty or liability relating to this information or the use thereof.The use of this information is governed by the Terms of Use, available at https://www.woltersATOMOOuwer.com/en/know/clinical-effectiveness-terms. 2024© UpToDate, Inc. and its affiliates and/or licensors. All rights reserved.  Copyright   © 2024 UpToDate, Inc. and/or its affiliates. All rights reserved.

## 2025-06-05 ENCOUNTER — TELEPHONE (OUTPATIENT)
Age: 35
End: 2025-06-05

## 2025-06-05 NOTE — TELEPHONE ENCOUNTER
Called pt to reschedule appointment with Dr Delatorre on 07/29 due to provider schedule change for [facility]. Patient hung up phone before I was able to reschedule appointment. Please schedule patient for NP appt (week of 9/30 is being held and open for rescheduling)

## 2025-08-12 ENCOUNTER — TELEPHONE (OUTPATIENT)
Dept: OBGYN CLINIC | Facility: CLINIC | Age: 35
End: 2025-08-12